# Patient Record
Sex: MALE | Race: WHITE
[De-identification: names, ages, dates, MRNs, and addresses within clinical notes are randomized per-mention and may not be internally consistent; named-entity substitution may affect disease eponyms.]

---

## 2020-01-04 ENCOUNTER — HOSPITAL ENCOUNTER (EMERGENCY)
Dept: HOSPITAL 25 - UCEAST | Age: 25
Discharge: HOME | End: 2020-01-04
Payer: COMMERCIAL

## 2020-01-04 VITALS — DIASTOLIC BLOOD PRESSURE: 68 MMHG | SYSTOLIC BLOOD PRESSURE: 119 MMHG

## 2020-01-04 DIAGNOSIS — Z88.0: ICD-10-CM

## 2020-01-04 DIAGNOSIS — J06.9: Primary | ICD-10-CM

## 2020-01-04 PROCEDURE — 87651 STREP A DNA AMP PROBE: CPT

## 2020-01-04 PROCEDURE — 99202 OFFICE O/P NEW SF 15 MIN: CPT

## 2020-01-04 PROCEDURE — G0463 HOSPITAL OUTPT CLINIC VISIT: HCPCS

## 2020-01-04 NOTE — UC
Throat Pain/Nasal Randall HPI





- HPI Summary


HPI Summary: 





patient started feeling sick yesterday with mild sore throat and fatigue. 


today awoke with congestion in chest, ST persists, feels sick





- History of Current Complaint


Chief Complaint: UCRespiratory


Stated Complaint: CONGESTION, SORE THROAT


Time Seen by Provider: 01/04/20 12:42


Hx Obtained From: Patient


Onset/Duration: Gradual Onset


Severity: Mild


Pain Intensity: 4


Cough: Nonproductive - mild


Associated Signs & Symptoms: Positive: Vomiting - x one episode after coughing.

  Negative: Sinus Discomfort, Fever





- Allergies/Home Medications


Allergies/Adverse Reactions: 


 Allergies











Allergy/AdvReac Type Severity Reaction Status Date / Time


 


amoxicillin Allergy  Vomiting Verified 01/04/20 12:35














PMH/Surg Hx/FS Hx/Imm Hx


Previously Healthy: Yes





- Surgical History


Surgical History: None





- Family History


Known Family History: Positive: Non-Contributory





- Social History


Occupation: Employed Full-time


Lives: With Family


Alcohol Use: Occasionally


Substance Use Type: None


Smoking Status (MU): Never Smoked Tobacco





Review of Systems


All Other Systems Reviewed And Are Negative: Yes


Constitutional: Positive: Fatigue


Skin: Positive: Negative.  Negative: Rash


Eyes: Positive: Negative


ENT: Positive: Sore Throat.  Negative: Ear Ache, Nasal Discharge, Sinus 

Congestion


Respiratory: Positive: Cough


Cardiovascular: Positive: Negative


Gastrointestinal: Negative: Abdominal Pain


Musculoskeletal: Positive: Negative


Neurological: Positive: Negative.  Negative: Headache


Psychological: Positive: Negative


Is Patient Immunocompromised?: No





Physical Exam


Triage Information Reviewed: Yes


Appearance: Well-Appearing, No Pain Distress, Well-Nourished


Vital Signs: 


 Initial Vital Signs











Temp  98.4 F   01/04/20 12:32


 


Pulse  97   01/04/20 12:32


 


Resp  12   01/04/20 12:32


 


BP  119/68   01/04/20 12:32


 


Pulse Ox  100   01/04/20 12:32











Vital Signs Reviewed: Yes


Eye Exam: Normal


Eyes: Positive: Conjunctiva Clear


ENT: Positive: Pharyngeal erythema, TMs normal.  Negative: Nasal congestion


Neck exam: Normal


Neck: Positive: Supple, Nontender, No Lymphadenopathy


Respiratory Exam: Normal


Respiratory: Positive: Lungs clear, Other: - no cough on exam


Cardiovascular Exam: Normal


Cardiovascular: Positive: RRR


Abdominal Exam: Normal


Abdomen Description: Positive: Nontender, No Organomegaly, Soft


Bowel Sounds: Positive: Present


Neurological Exam: Normal


Psychological Exam: Normal


Skin Exam: Normal


Skin: Negative: Rashes





Throat Pain/Nasal Course/Dx





- Differential Dx/Diagnosis


Differential Diagnosis/HQI/PQRI: Pharyngitis, Sinusitis, Tonsillitis, URI


Provider Diagnosis: 


 Upper respiratory infection








Discharge ED





- Sign-Out/Discharge


Documenting (check all that apply): Patient Departure


All imaging exams completed and their final reports reviewed: No Studies





- Discharge Plan


Condition: Good


Disposition: HOME


Prescriptions: 


Azithromycin TAB* [Zithromax TAB (Z-TONI) 250 mg #6 tabs] 2 tab PO .TODAY, THEN 

1 DAILY #1 toni


Patient Education Materials:  Upper Respiratory Infection (DC)


Forms:  *Work Release


Referrals: 


Raphael Zimmerman MD [Primary Care Provider] - 3 Days (if no better)


Additional Instructions: 


drink plenty of fluids and rest


start zithromax if no better 48hours 





use over the counter DayQuil/NyQuil for symptom relief as directed





- Billing Disposition and Condition


Condition: GOOD


Disposition: Home





- Attestation Statements


Provider Attestation: 





I was available for consult. This patient was seen by the FLOR. The patient was 

not presented to, seen by, or examined by me. -Farhana